# Patient Record
(demographics unavailable — no encounter records)

---

## 2025-01-15 NOTE — ASSESSMENT
[FreeTextEntry1] : Reviewed and reconciled medications, allergies, PMHx, PSHx, SocHx, FMHx.  Patient presents today with Mom stating that she had strep throat at the end of December 2024 and her tonsils have been very large since. She states that her strep throat was proven with testing. She states that she was given a course of antibiotics and a steroid shot. She states that it was hard to breathe because her tonsils were so enlarged. She states that the size of her tonsils decreased with antibiotics and steroids, but then they started swelling again after she stopped taking the medication. She denies snoring. She denies ever having mononucleosis.  Physical exam: -NOSE score 30 -TNSS 1 -ears are clean and clear bilaterally -no lateralization to tuning forks -deviated septum -swollen and edematous turbinates bilaterally L>R -thick oral PND -class 4 tonsils - no obvious acute infection -cervical lymphadenopathy level 2B bilaterally  ENT Procedure: ENT Culture  Plan: culture taken - pending results -Start DioxiRinse -Ordered blood work -Discussed tonsillectomy pending results from culture. Possible complications including but not limited to infection, pain, bleeding, complications from anesthesia, and need for further surgery were discussed. All questions were answered. -FU with results from culture and blood work

## 2025-01-15 NOTE — PROCEDURE
[FreeTextEntry1] : ENT Culture [FreeTextEntry2] : tonsillitis [FreeTextEntry3] : Cotton swab used to collect secretions from the tonsils in the oral cavity. Pathology sent to lab.

## 2025-01-15 NOTE — PHYSICAL EXAM
[Hearing Dietz Test (Tuning Fork On Forehead)] : no lateralization of tone [] : septum deviated bilaterally [Midline] : trachea located in midline position [Normal] : no rashes [de-identified] : cervical lymphadenopathy level 2B bilaterally [Hearing Loss Right Only] : normal [Hearing Loss Left Only] : normal [de-identified] : swollen and edematous turbinates bilaterally L>R [de-identified] : class 4 tonsils - no obvious acute infection [de-identified] : thick oral PND [FreeTextEntry2] : sinuses nontender to percussion. sensations intact

## 2025-01-15 NOTE — ADDENDUM
[FreeTextEntry1] :  Documented by Nic Baltazar acting as scribe for Dr. Magdaleno on 01/15/2025. All Medical record entries made by the Scribe were at my, Dr. Magdaleno, direction and personally dictated by me on 01/15/2025 . I have reviewed the chart and agree that the record accurately reflects my personal performance of the history, physical exam, assessment and plan. I have also personally directed, reviewed, and agreed with the discharge instructions.

## 2025-01-15 NOTE — REVIEW OF SYSTEMS
[Seasonal Allergies] : seasonal allergies [Dizziness] : dizziness [Vertigo] : vertigo [Lightheadedness] : lightheadedness [Throat Pain] : throat pain [Swelling Neck] : swelling neck [Swelling Face] : face swelling [Eyes Itch] : itching of the eyes [Shortness Of Breath] : shortness of breath [Cough] : cough [Anxiety] : anxiety [Swollen Glands] : swollen glands [Negative] : Endocrine [FreeTextEntry1] : headaches, difficulty swallowing, hives

## 2025-01-15 NOTE — CONSULT LETTER
[Dear  ___] : Dear  [unfilled], [Consult Letter:] : I had the pleasure of evaluating your patient, [unfilled]. [Please see my note below.] : Please see my note below. [Consult Closing:] : Thank you very much for allowing me to participate in the care of this patient.  If you have any questions, please do not hesitate to contact me. [Sincerely,] : Sincerely, [FreeTextEntry3] :  Wyatt Magdaleno MD FACS

## 2025-01-15 NOTE — HISTORY OF PRESENT ILLNESS
[de-identified] : Patient presents today with Mom stating that she had strep throat at the end of December 2024 and her tonsils have been very large since. She states that her strep throat was proven with testing. She states that she was given a course of antibiotics and a steroid shot. She states that it was hard to breathe because her tonsils were so enlarged. She states that the size of her tonsils decreased with antibiotics and steroids, but then they started swelling again after she stopped taking the medication. She denies snoring. She denies ever having mononucleosis.